# Patient Record
Sex: FEMALE | Race: WHITE | NOT HISPANIC OR LATINO | RURAL
[De-identification: names, ages, dates, MRNs, and addresses within clinical notes are randomized per-mention and may not be internally consistent; named-entity substitution may affect disease eponyms.]

---

## 2017-07-06 ENCOUNTER — APPOINTMENT (RX ONLY)
Dept: RURAL CLINIC 4 | Facility: CLINIC | Age: 57
Setting detail: DERMATOLOGY
End: 2017-07-06

## 2017-07-06 DIAGNOSIS — R23.3 SPONTANEOUS ECCHYMOSES: ICD-10-CM

## 2017-07-06 DIAGNOSIS — L11.1 TRANSIENT ACANTHOLYTIC DERMATOSIS [GROVER]: ICD-10-CM

## 2017-07-06 DIAGNOSIS — Z85.828 PERSONAL HISTORY OF OTHER MALIGNANT NEOPLASM OF SKIN: ICD-10-CM

## 2017-07-06 PROCEDURE — ? COUNSELING

## 2017-07-06 PROCEDURE — 99213 OFFICE O/P EST LOW 20 MIN: CPT

## 2017-07-06 PROCEDURE — ? PRESCRIPTION

## 2017-07-06 RX ORDER — TRIAMCINOLONE ACETONIDE 1 MG/G
CREAM TOPICAL BID
Qty: 1 | Refills: 1 | Status: ERX

## 2017-07-06 ASSESSMENT — LOCATION ZONE DERM
LOCATION ZONE: FACE
LOCATION ZONE: LEG
LOCATION ZONE: TRUNK

## 2017-07-06 ASSESSMENT — LOCATION DETAILED DESCRIPTION DERM
LOCATION DETAILED: LEFT ANTERIOR DISTAL THIGH
LOCATION DETAILED: LEFT LATERAL BUCCAL CHEEK
LOCATION DETAILED: LOWER STERNUM

## 2017-07-06 ASSESSMENT — LOCATION SIMPLE DESCRIPTION DERM
LOCATION SIMPLE: LEFT THIGH
LOCATION SIMPLE: CHEST
LOCATION SIMPLE: LEFT CHEEK

## 2017-07-06 NOTE — HPI: SKIN LESION
How Severe Is Your Skin Lesion?: moderate
Has Your Skin Lesion Been Treated?: not been treated
Is This A New Presentation, Or A Follow-Up?: Skin Lesion
Additional History: She thinks it may be a bruise.

## 2019-02-08 ENCOUNTER — APPOINTMENT (RX ONLY)
Dept: RURAL CLINIC 4 | Facility: CLINIC | Age: 59
Setting detail: DERMATOLOGY
End: 2019-02-08

## 2019-02-08 DIAGNOSIS — Z85.828 PERSONAL HISTORY OF OTHER MALIGNANT NEOPLASM OF SKIN: ICD-10-CM

## 2019-02-08 DIAGNOSIS — L43.8 OTHER LICHEN PLANUS: ICD-10-CM

## 2019-02-08 PROCEDURE — ? TREATMENT REGIMEN

## 2019-02-08 PROCEDURE — 99213 OFFICE O/P EST LOW 20 MIN: CPT

## 2019-02-08 PROCEDURE — ? COUNSELING

## 2019-02-08 ASSESSMENT — LOCATION DETAILED DESCRIPTION DERM
LOCATION DETAILED: RIGHT MIDDLE FINGERNAIL
LOCATION DETAILED: LEFT INDEX FINGERNAIL
LOCATION DETAILED: RIGHT RING FINGERNAIL
LOCATION DETAILED: RIGHT SMALL FINGERNAIL
LOCATION DETAILED: LEFT RING FINGERNAIL
LOCATION DETAILED: LEFT MIDDLE FINGERNAIL
LOCATION DETAILED: LEFT SMALL FINGERNAIL
LOCATION DETAILED: LEFT THUMBNAIL
LOCATION DETAILED: RIGHT INFERIOR CENTRAL MALAR CHEEK
LOCATION DETAILED: RIGHT INDEX FINGERNAIL
LOCATION DETAILED: RIGHT THUMBNAIL

## 2019-02-08 ASSESSMENT — LOCATION SIMPLE DESCRIPTION DERM
LOCATION SIMPLE: LEFT RING FINGERNAIL
LOCATION SIMPLE: RIGHT MIDDLE FINGERNAIL
LOCATION SIMPLE: LEFT SMALL FINGERNAIL
LOCATION SIMPLE: LEFT INDEX FINGERNAIL
LOCATION SIMPLE: RIGHT SMALL FINGERNAIL
LOCATION SIMPLE: LEFT MIDDLE FINGERNAIL
LOCATION SIMPLE: RIGHT RING FINGERNAIL
LOCATION SIMPLE: RIGHT THUMBNAIL
LOCATION SIMPLE: RIGHT INDEX FINGERNAIL
LOCATION SIMPLE: RIGHT CHEEK
LOCATION SIMPLE: LEFT THUMBNAIL

## 2019-02-08 ASSESSMENT — LOCATION ZONE DERM
LOCATION ZONE: FACE
LOCATION ZONE: FINGERNAIL

## 2019-02-08 ASSESSMENT — SEVERITY ASSESSMENT: SEVERITY: MODERATE

## 2019-02-08 NOTE — PROCEDURE: TREATMENT REGIMEN
Detail Level: Simple
Plan: Discussed possible causes of this nail change.  She will get us her lab results next week from her pcp.  For now, apply aquaphor ointment around her nails to assure there is no underlying dermatitis.  Since she is otherwise feeling well, will discuss her case with Dr. Anne and call her back next week when we get her lab results.

## 2019-02-08 NOTE — HPI: NAIL DYSTROPHY
How Severe Is It?: severe
Is This A New Presentation, Or A Follow-Up?: Nail Dystrophy
Additional History: She is scheduled with ADAM Ferguson for blood work next week.

## 2019-10-07 ENCOUNTER — APPOINTMENT (RX ONLY)
Dept: RURAL CLINIC 4 | Facility: CLINIC | Age: 59
Setting detail: DERMATOLOGY
End: 2019-10-07

## 2019-10-07 DIAGNOSIS — L259 CONTACT DERMATITIS AND OTHER ECZEMA, UNSPECIFIED CAUSE: ICD-10-CM

## 2019-10-07 DIAGNOSIS — D22 MELANOCYTIC NEVI: ICD-10-CM

## 2019-10-07 DIAGNOSIS — Z85.828 PERSONAL HISTORY OF OTHER MALIGNANT NEOPLASM OF SKIN: ICD-10-CM

## 2019-10-07 PROBLEM — D48.5 NEOPLASM OF UNCERTAIN BEHAVIOR OF SKIN: Status: ACTIVE | Noted: 2019-10-07

## 2019-10-07 PROBLEM — J30.1 ALLERGIC RHINITIS DUE TO POLLEN: Status: ACTIVE | Noted: 2019-10-07

## 2019-10-07 PROBLEM — L30.8 OTHER SPECIFIED DERMATITIS: Status: ACTIVE | Noted: 2019-10-07

## 2019-10-07 PROCEDURE — ? COUNSELING

## 2019-10-07 PROCEDURE — ? TREATMENT REGIMEN

## 2019-10-07 PROCEDURE — 11102 TANGNTL BX SKIN SINGLE LES: CPT

## 2019-10-07 PROCEDURE — ? PRESCRIPTION

## 2019-10-07 PROCEDURE — ? BIOPSY BY SHAVE METHOD

## 2019-10-07 PROCEDURE — 99213 OFFICE O/P EST LOW 20 MIN: CPT | Mod: 25

## 2019-10-07 RX ORDER — TRIAMCINOLONE ACETONIDE 1 MG/G
CREAM TOPICAL
Qty: 1 | Refills: 2 | Status: ERX

## 2019-10-07 ASSESSMENT — LOCATION DETAILED DESCRIPTION DERM
LOCATION DETAILED: RIGHT SUPERIOR MEDIAL UPPER BACK
LOCATION DETAILED: LEFT CENTRAL BUCCAL CHEEK
LOCATION DETAILED: LEFT POSTERIOR ANKLE

## 2019-10-07 ASSESSMENT — LOCATION SIMPLE DESCRIPTION DERM
LOCATION SIMPLE: LEFT ANKLE
LOCATION SIMPLE: RIGHT UPPER BACK
LOCATION SIMPLE: LEFT CHEEK

## 2019-10-07 ASSESSMENT — LOCATION ZONE DERM
LOCATION ZONE: FACE
LOCATION ZONE: TRUNK
LOCATION ZONE: LEG

## 2019-10-07 NOTE — PROCEDURE: TREATMENT REGIMEN
Plan: Avoid scratching, and start apply 0.1% Triamcinolone cream on areas twice daily or as needed for itch followed by moisturizer, for 2 weeks/month. Avoid water exposure, and use a gentle cleanser. Apply moisturizer all the time.
Discontinue Regimen: Applying neosporin
Detail Level: Zone

## 2019-10-07 NOTE — PROCEDURE: BIOPSY BY SHAVE METHOD
Path Notes (To The Dermatopathologist): New enlarging pink papule.  ? basal cell carcinoma vs squamous cell carcinoma
Billing Type: Third-Party Bill
Dressing: bandage
Wound Care: Polysporin ointment
Curettage Text: The wound bed was treated with curettage after the biopsy was performed.
Type Of Destruction Used: Curettage
Render Path Notes In Note?: No
Hemostasis: Drysol
Depth Of Biopsy: dermis
Electrodesiccation Text: The wound bed was treated with electrodesiccation after the biopsy was performed.
Size Of Lesion In Cm: 0
Electrodesiccation And Curettage Text: The wound bed was treated with electrodesiccation and curettage after the biopsy was performed.
Render Post-Care Instructions In Note?: yes
Cryotherapy Text: The wound bed was treated with cryotherapy after the biopsy was performed.
Silver Nitrate Text: The wound bed was treated with silver nitrate after the biopsy was performed.
Detail Level: Detailed
Accession #: [id number]
Anesthesia Type: 1% lidocaine with 1:200,000 epinephrine
Biopsy Method: Dermablade
Post-Care Instructions: Aleve OTC as directed.
Biopsy Type: H and E
Anesthesia Volume In Cc: 0.5
Path Notes Override (Will Replace All Of The Above Text): Irregular brown macule. R/o atypia

## 2020-05-15 ENCOUNTER — APPOINTMENT (RX ONLY)
Dept: RURAL CLINIC 4 | Facility: CLINIC | Age: 60
Setting detail: DERMATOLOGY
End: 2020-05-15

## 2020-05-15 DIAGNOSIS — L259 CONTACT DERMATITIS AND OTHER ECZEMA, UNSPECIFIED CAUSE: ICD-10-CM

## 2020-05-15 DIAGNOSIS — Z85.828 PERSONAL HISTORY OF OTHER MALIGNANT NEOPLASM OF SKIN: ICD-10-CM

## 2020-05-15 PROBLEM — L23.9 ALLERGIC CONTACT DERMATITIS, UNSPECIFIED CAUSE: Status: ACTIVE | Noted: 2020-05-15

## 2020-05-15 PROCEDURE — 99213 OFFICE O/P EST LOW 20 MIN: CPT

## 2020-05-15 PROCEDURE — ? TREATMENT REGIMEN

## 2020-05-15 PROCEDURE — ? COUNSELING

## 2020-05-15 ASSESSMENT — LOCATION ZONE DERM
LOCATION ZONE: EAR
LOCATION ZONE: FACE

## 2020-05-15 ASSESSMENT — LOCATION SIMPLE DESCRIPTION DERM
LOCATION SIMPLE: LEFT CHEEK
LOCATION SIMPLE: LEFT EAR

## 2020-05-15 ASSESSMENT — LOCATION DETAILED DESCRIPTION DERM
LOCATION DETAILED: LEFT ANTITRAGUS
LOCATION DETAILED: LEFT CENTRAL BUCCAL CHEEK

## 2020-05-15 NOTE — PROCEDURE: TREATMENT REGIMEN
Plan: Begin applying your 0.1% Triamcinolone cream twice daily for up to 2 weeks/month.
Detail Level: Simple

## 2021-03-16 ENCOUNTER — APPOINTMENT (RX ONLY)
Dept: RURAL CLINIC 4 | Facility: CLINIC | Age: 61
Setting detail: DERMATOLOGY
End: 2021-03-16

## 2021-03-16 DIAGNOSIS — I78.8 OTHER DISEASES OF CAPILLARIES: ICD-10-CM

## 2021-03-16 DIAGNOSIS — L57.0 ACTINIC KERATOSIS: ICD-10-CM

## 2021-03-16 DIAGNOSIS — S90.1 CONTUSION OF TOE WITHOUT DAMAGE TO NAIL: ICD-10-CM

## 2021-03-16 DIAGNOSIS — L81.0 POSTINFLAMMATORY HYPERPIGMENTATION: ICD-10-CM

## 2021-03-16 DIAGNOSIS — Z85.828 PERSONAL HISTORY OF OTHER MALIGNANT NEOPLASM OF SKIN: ICD-10-CM

## 2021-03-16 PROBLEM — S90.112A CONTUSION OF LEFT GREAT TOE WITHOUT DAMAGE TO NAIL, INITIAL ENCOUNTER: Status: ACTIVE | Noted: 2021-03-16

## 2021-03-16 PROCEDURE — 99213 OFFICE O/P EST LOW 20 MIN: CPT | Mod: 25

## 2021-03-16 PROCEDURE — ? PRESCRIPTION

## 2021-03-16 PROCEDURE — 17000 DESTRUCT PREMALG LESION: CPT

## 2021-03-16 PROCEDURE — ? LIQUID NITROGEN

## 2021-03-16 PROCEDURE — ? TREATMENT REGIMEN

## 2021-03-16 PROCEDURE — ? COUNSELING

## 2021-03-16 RX ORDER — FLUOROURACIL 5 MG/G
CREAM TOPICAL
Qty: 1 | Refills: 1 | Status: ERX | COMMUNITY
Start: 2021-03-16

## 2021-03-16 RX ADMIN — FLUOROURACIL: 5 CREAM TOPICAL at 00:00

## 2021-03-16 ASSESSMENT — LOCATION DETAILED DESCRIPTION DERM
LOCATION DETAILED: LEFT SUPERIOR HELIX
LOCATION DETAILED: RIGHT INFERIOR FOREHEAD
LOCATION DETAILED: LEFT GREAT TOENAIL
LOCATION DETAILED: LEFT UPPER CUTANEOUS LIP
LOCATION DETAILED: RIGHT INFERIOR MEDIAL FOREHEAD
LOCATION DETAILED: LEFT INFERIOR FOREHEAD
LOCATION DETAILED: LEFT CENTRAL BUCCAL CHEEK
LOCATION DETAILED: RIGHT ANTERIOR LATERAL PROXIMAL THIGH

## 2021-03-16 ASSESSMENT — LOCATION ZONE DERM
LOCATION ZONE: TOENAIL
LOCATION ZONE: LIP
LOCATION ZONE: EAR
LOCATION ZONE: FACE
LOCATION ZONE: LEG

## 2021-03-16 ASSESSMENT — LOCATION SIMPLE DESCRIPTION DERM
LOCATION SIMPLE: RIGHT FOREHEAD
LOCATION SIMPLE: RIGHT THIGH
LOCATION SIMPLE: LEFT FOREHEAD
LOCATION SIMPLE: LEFT LIP
LOCATION SIMPLE: LEFT EAR
LOCATION SIMPLE: LEFT GREAT TOE
LOCATION SIMPLE: LEFT CHEEK

## 2021-03-16 NOTE — PROCEDURE: TREATMENT REGIMEN
Plan: Start treating by applying nightly until irritation then stopping and allowing to heal, repeating as needed.
Plan: This site appears to be healing, but the clustered pattern indicates potential shingles outbreak.  Consider getting the Shingrex shot event marie she had the Zostrix many years ago.  Apply aquaphor or vaseline ointment on this site to promote healing.
Detail Level: Simple

## 2021-04-06 ENCOUNTER — APPOINTMENT (RX ONLY)
Dept: RURAL CLINIC 4 | Facility: CLINIC | Age: 61
Setting detail: DERMATOLOGY
End: 2021-04-06

## 2021-04-06 DIAGNOSIS — L24.4 IRRITANT CONTACT DERMATITIS DUE TO DRUGS IN CONTACT WITH SKIN: ICD-10-CM

## 2021-04-06 DIAGNOSIS — L25.9 UNSPECIFIED CONTACT DERMATITIS, UNSPECIFIED CAUSE: ICD-10-CM

## 2021-04-06 PROBLEM — I10 ESSENTIAL (PRIMARY) HYPERTENSION: Status: ACTIVE | Noted: 2021-04-06

## 2021-04-06 PROCEDURE — ? TREATMENT REGIMEN

## 2021-04-06 PROCEDURE — 99213 OFFICE O/P EST LOW 20 MIN: CPT

## 2021-04-06 PROCEDURE — ? PRESCRIPTION

## 2021-04-06 PROCEDURE — ? COUNSELING

## 2021-04-06 RX ORDER — HYDROCORTISONE 25 MG/G
OINTMENT TOPICAL
Qty: 1 | Refills: 1 | Status: ERX | COMMUNITY
Start: 2021-04-06

## 2021-04-06 RX ADMIN — HYDROCORTISONE: 25 OINTMENT TOPICAL at 00:00

## 2021-04-06 ASSESSMENT — LOCATION DETAILED DESCRIPTION DERM: LOCATION DETAILED: LEFT UPPER CUTANEOUS LIP

## 2021-04-06 ASSESSMENT — LOCATION ZONE DERM: LOCATION ZONE: LIP

## 2021-04-06 ASSESSMENT — LOCATION SIMPLE DESCRIPTION DERM: LOCATION SIMPLE: LEFT LIP

## 2021-04-06 NOTE — PROCEDURE: TREATMENT REGIMEN
Plan: Continue to use Vaseline on area as directed above and will send in some topical hydrocortisone cream to reduce inflammation.  Use no more than 2 weeks of the month as needed.
Detail Level: Simple
Detail Level: Zone
Plan: Do not apply fluorouracil cream on recently treated liquid nitrogen sites.  Allow them to heal and shed first which can take up to a month to finish doing.  Emphasized this and the fact that nitrogen itself is part of our atmosphere , so any reaction she had to it was a normal response to the treatment.  Reassured her that everyone becomes irritated, possibly blistered and crusted with liquid nitrogen treatments.  Advise an increase in Vaseline to every hour or so during the day.  This will encourage the lesion to finish healing.  Follow up in a month for a recheck if the lesion persists.

## 2022-02-23 ENCOUNTER — APPOINTMENT (RX ONLY)
Dept: RURAL CLINIC 5 | Facility: CLINIC | Age: 62
Setting detail: DERMATOLOGY
End: 2022-02-23

## 2022-02-23 DIAGNOSIS — L91.8 OTHER HYPERTROPHIC DISORDERS OF THE SKIN: ICD-10-CM

## 2022-02-23 DIAGNOSIS — L57.0 ACTINIC KERATOSIS: ICD-10-CM | Status: IMPROVED

## 2022-02-23 DIAGNOSIS — L72.8 OTHER FOLLICULAR CYSTS OF THE SKIN AND SUBCUTANEOUS TISSUE: ICD-10-CM

## 2022-02-23 DIAGNOSIS — L11.1 TRANSIENT ACANTHOLYTIC DERMATOSIS [GROVER]: ICD-10-CM

## 2022-02-23 PROCEDURE — ? PRESCRIPTION

## 2022-02-23 PROCEDURE — ? TREATMENT REGIMEN

## 2022-02-23 PROCEDURE — ? COUNSELING

## 2022-02-23 PROCEDURE — 99213 OFFICE O/P EST LOW 20 MIN: CPT

## 2022-02-23 PROCEDURE — ? BIOPSY BY PUNCH METHOD (COSMETIC)

## 2022-02-23 PROCEDURE — ? INVENTORY

## 2022-02-23 RX ORDER — TRIAMCINOLONE ACETONIDE 1 MG/G
CREAM TOPICAL BID
Qty: 80 | Refills: 2 | Status: ERX | COMMUNITY
Start: 2022-02-23

## 2022-02-23 RX ADMIN — TRIAMCINOLONE ACETONIDE: 1 CREAM TOPICAL at 00:00

## 2022-02-23 ASSESSMENT — LOCATION SIMPLE DESCRIPTION DERM
LOCATION SIMPLE: UPPER BACK
LOCATION SIMPLE: LEFT NOSE
LOCATION SIMPLE: RIGHT AXILLARY VAULT
LOCATION SIMPLE: ABDOMEN
LOCATION SIMPLE: NOSE

## 2022-02-23 ASSESSMENT — LOCATION ZONE DERM
LOCATION ZONE: NOSE
LOCATION ZONE: AXILLAE
LOCATION ZONE: TRUNK

## 2022-02-23 ASSESSMENT — LOCATION DETAILED DESCRIPTION DERM
LOCATION DETAILED: LEFT NASAL DORSUM
LOCATION DETAILED: LEFT NASAL SIDEWALL
LOCATION DETAILED: RIGHT AXILLARY VAULT
LOCATION DETAILED: INFERIOR THORACIC SPINE
LOCATION DETAILED: PERIUMBILICAL SKIN

## 2022-02-23 NOTE — PROCEDURE: TREATMENT REGIMEN
Plan: Use 5% Fluorouracil cream on site until irritation occurs then stop and let heal, repeat as needed.
Detail Level: Zone
Plan: Use 0.1% Triamcinolone cream on sites if itch occurs. Advised using CeraVe SA cream to smooth out the bumps.

## 2022-02-23 NOTE — PROCEDURE: BIOPSY BY PUNCH METHOD (COSMETIC)
Detail Level: Detailed
Punch Size In Mm: 6
Biopsy Type: H and E
Anesthesia Type: 1% lidocaine with epinephrine
Anesthesia Volume In Cc: 2
Additional Anesthesia Volume In Cc (Will Not Render If 0): 0
Hemostasis: None
Epidermal Sutures: 5-0 Ethilon
Wound Care: Petrolatum
Suture Removal: 14 days
Lab: [id number]
Lab Facility: [id number]
Path Notes Override (Will Replace All Of The Above Text): No pathology.
Render Path Notes In Note?: No
Consent: Written consent was obtained and risks were reviewed including but not limited to scarring, infection, bleeding, scabbing, incomplete removal, nerve damage and allergy to anesthesia.
Post-Care Instructions: I reviewed with the patient in detail post-care instructions. Patient is to keep the biopsy site dry overnight, and then apply bacitracin twice daily until healed. Patient may apply hydrogen peroxide soaks to remove any crusting.
Notification Instructions: Patient will be notified of biopsy results. However, patient instructed to call the office if not contacted within 2 weeks.
Billing Type: Third-Party Bill

## 2022-03-09 ENCOUNTER — APPOINTMENT (RX ONLY)
Dept: RURAL CLINIC 5 | Facility: CLINIC | Age: 62
Setting detail: DERMATOLOGY
End: 2022-03-09

## 2022-03-09 DIAGNOSIS — Z48.02 ENCOUNTER FOR REMOVAL OF SUTURES: ICD-10-CM

## 2022-03-09 PROCEDURE — 99024 POSTOP FOLLOW-UP VISIT: CPT

## 2022-03-09 PROCEDURE — ? SUTURE REMOVAL (GLOBAL PERIOD)

## 2022-03-09 ASSESSMENT — LOCATION DETAILED DESCRIPTION DERM: LOCATION DETAILED: RIGHT ANTERIOR AXILLA

## 2022-03-09 ASSESSMENT — LOCATION ZONE DERM: LOCATION ZONE: AXILLAE

## 2022-03-09 ASSESSMENT — LOCATION SIMPLE DESCRIPTION DERM: LOCATION SIMPLE: RIGHT ANTERIOR AXILLA

## 2022-03-09 NOTE — PROCEDURE: SUTURE REMOVAL (GLOBAL PERIOD)
Detail Level: Detailed
Add 33129 Cpt? (Important Note: In 2017 The Use Of 21759 Is Being Tracked By Cms To Determine Future Global Period Reimbursement For Global Periods): yes

## 2024-10-25 ENCOUNTER — APPOINTMENT (RX ONLY)
Dept: RURAL CLINIC 5 | Facility: CLINIC | Age: 64
Setting detail: DERMATOLOGY
End: 2024-10-25

## 2024-10-25 DIAGNOSIS — L24 IRRITANT CONTACT DERMATITIS: ICD-10-CM

## 2024-10-25 DIAGNOSIS — L72.8 OTHER FOLLICULAR CYSTS OF THE SKIN AND SUBCUTANEOUS TISSUE: ICD-10-CM

## 2024-10-25 PROBLEM — L24.9 IRRITANT CONTACT DERMATITIS, UNSPECIFIED CAUSE: Status: ACTIVE | Noted: 2024-10-25

## 2024-10-25 PROCEDURE — ? PRESCRIPTION

## 2024-10-25 PROCEDURE — ? TREATMENT REGIMEN

## 2024-10-25 PROCEDURE — ? COUNSELING

## 2024-10-25 PROCEDURE — 99213 OFFICE O/P EST LOW 20 MIN: CPT

## 2024-10-25 RX ORDER — CLOBETASOL PROPIONATE 0.5 MG/G
OINTMENT TOPICAL
Qty: 60 | Refills: 2 | Status: ERX | COMMUNITY
Start: 2024-10-25

## 2024-10-25 RX ADMIN — CLOBETASOL PROPIONATE: 0.5 OINTMENT TOPICAL at 00:00

## 2024-10-25 ASSESSMENT — LOCATION ZONE DERM
LOCATION ZONE: LEG
LOCATION ZONE: TRUNK

## 2024-10-25 ASSESSMENT — LOCATION SIMPLE DESCRIPTION DERM
LOCATION SIMPLE: RIGHT BREAST
LOCATION SIMPLE: LEFT PRETIBIAL REGION

## 2024-10-25 ASSESSMENT — LOCATION DETAILED DESCRIPTION DERM
LOCATION DETAILED: LEFT DISTAL PRETIBIAL REGION
LOCATION DETAILED: RIGHT MEDIAL BREAST 2-3:00 REGION

## 2024-10-25 NOTE — PROCEDURE: TREATMENT REGIMEN
Detail Level: Simple
Plan: Shower every other day with cetaphil gentle skin cleanser. Stop neosporin, start using clobetasol with Vaseline and recheck in 2 weeks.